# Patient Record
Sex: FEMALE | Race: ASIAN | NOT HISPANIC OR LATINO | URBAN - METROPOLITAN AREA
[De-identification: names, ages, dates, MRNs, and addresses within clinical notes are randomized per-mention and may not be internally consistent; named-entity substitution may affect disease eponyms.]

---

## 2019-10-04 ENCOUNTER — EMERGENCY (EMERGENCY)
Facility: HOSPITAL | Age: 44
LOS: 1 days | Discharge: HOME | End: 2019-10-04
Attending: STUDENT IN AN ORGANIZED HEALTH CARE EDUCATION/TRAINING PROGRAM | Admitting: STUDENT IN AN ORGANIZED HEALTH CARE EDUCATION/TRAINING PROGRAM
Payer: MEDICAID

## 2019-10-04 VITALS
HEART RATE: 73 BPM | DIASTOLIC BLOOD PRESSURE: 84 MMHG | SYSTOLIC BLOOD PRESSURE: 133 MMHG | RESPIRATION RATE: 18 BRPM | TEMPERATURE: 98 F | OXYGEN SATURATION: 99 %

## 2019-10-04 PROCEDURE — 99282 EMERGENCY DEPT VISIT SF MDM: CPT

## 2019-10-04 NOTE — ED ADULT TRIAGE NOTE - CHIEF COMPLAINT QUOTE
Pt states, "I was watching tv and then I started to have a headache and blurry vision in the left eye. I noticed my eye is now red as if I popped a blood vessel." Sent in from [facility] for further evaluation. No neuro deficits noted. Denies PMH.

## 2019-10-05 VITALS
SYSTOLIC BLOOD PRESSURE: 126 MMHG | HEART RATE: 66 BPM | DIASTOLIC BLOOD PRESSURE: 67 MMHG | OXYGEN SATURATION: 100 % | RESPIRATION RATE: 16 BRPM

## 2019-10-05 RX ORDER — ACETAMINOPHEN 500 MG
975 TABLET ORAL ONCE
Refills: 0 | Status: COMPLETED | OUTPATIENT
Start: 2019-10-05 | End: 2019-10-05

## 2019-10-05 RX ADMIN — Medication 975 MILLIGRAM(S): at 01:46

## 2019-10-05 NOTE — ED PROVIDER NOTE - ATTENDING CONTRIBUTION TO CARE
43F h/o iron deficiency anema p/w headache and eye redness. Pt states that she was watching TV when she developed a HA and eye redness. Pt states that the HA is pressure like, full head. Not worst HA ever, nor sudden onset of severe HA. She noted that her L eye was red at the time as well so went to urgent care who sent to ED. Denies head trauma or falls. No n/v. No dizziness/lightheadedness.  Exam:    Gen - nad    HEENT: L eye with subconjunctival hemorrhage to medial aspect, vision 20/20 both eyes, EOMI, PERRL    CV: rrr, no murmurs    Pulm: clear lungs    Neuro: Cn 2-12 grossly intact, no sensory/motor deficits appreciated  MDM: 43F p/w L eye redness and HA starting at approximately 2130 - low suspicion for SAH or other intracranial pathology, will treat symptomatically; eye with subconjunctival hemorrhage and no visual deficits, no further intervention required at this time

## 2019-10-05 NOTE — ED PROVIDER NOTE - NS ED ROS FT
ROS:  GENERAL: No fever, no chills  EYES: no change in vision  HEENT: no trouble swallowing, no trouble speaking  CARDIAC: no chest pain  PULMONARY: no cough, no shortness of breath  GI: no abdominal pain, no nausea, no vomiting, no diarrhea, no constipation  : No dysuria, no frequency, no change in appearance, or odor of urine  SKIN: no rashes  NEURO: + headache, no weakness  MSK: No joint pain  ~Gomez Shah D.O. -Resident

## 2019-10-05 NOTE — ED PROVIDER NOTE - OBJECTIVE STATEMENT
42yo f no pmhx pw left eye redness after watching a movie started to have gradual onset headache and looked in the mirror because her eye felt different and reports like she popped a blood vessel." Sent in from [facility] for further evaluation. Denies recent trauma, fevers, chills, dizziness, nausea, vomiting, dysuria, freq, hematuria, diarrhea, constipation, chest pain, shortness of breath, cough.

## 2019-10-05 NOTE — ED PROVIDER NOTE - CLINICAL SUMMARY MEDICAL DECISION MAKING FREE TEXT BOX
44yo f no pmhx pw left eye redness after watching a movie started to have gradual onset headache and looked in the mirror because her eye felt different and reports like she popped a blood vessel." now with ha and subconjunctival hemorrhage. va intact, ha no red flags, will give po meds, dc pcp/optho fu, offered ct head and iv meds patient refused. given return precautions

## 2019-10-05 NOTE — ED PROVIDER NOTE - NSFOLLOWUPINSTRUCTIONS_ED_ALL_ED_FT
Headache    A headache is pain or discomfort felt around the head or neck area. The specific cause of a headache may not be found as there are many types including tension headaches, migraine headaches, and cluster headaches. Watch your condition for any changes. Things you can do to manage your pain include taking over the counter and prescription medications as instructed by your health care provider, lying down in a dark quiet room, limiting stress, getting regular sleep, and refraining from alcohol and tobacco products.    SEEK IMMEDIATE MEDICAL CARE IF YOU HAVE ANY OF THE FOLLOWING SYMPTOMS: fever, vomiting, stiff neck, loss of vision, problems with speech, muscle weakness, loss of balance, trouble walking, passing out, or confusion.    1. TAKE ALL MEDICATIONS AS DIRECTED.    2. FOR PAIN OR FEVER YOU CAN TAKE IBUPROFEN (MOTRIN, ADVIL) OR ACETAMINOPHEN (TYLENOL) AS NEEDED, AS DIRECTED ON PACKAGING.  3. FOLLOW UP WITH YOUR PRIMARY DOCTOR WITHIN 5 DAYS AS DIRECTED.  4. IF YOU HAD LABS OR IMAGING DONE, YOU WERE GIVEN COPIES OF ALL LABS AND/OR IMAGING RESULTS FROM YOUR ER VISIT--PLEASE TAKE THEM WITH YOU TO YOUR FOLLOW UP APPOINTMENTS.  5. IF NEEDED, CALL PATIENT ACCESS SERVICES AT [phone] TO FIND A PRIMARY CARE PHYSICIAN.  OR CALL [phone] TO MAKE AN APPOINTMENT WITH THE CLINIC.  6. RETURN TO THE ER FOR ANY WORSENING SYMPTOMS OR CONCERNS.

## 2019-10-05 NOTE — ED PROVIDER NOTE - NSFOLLOWUPCLINICS_GEN_ALL_ED_FT
A Family Medicine Doctor  Family Medicine  .  [place]   Phone:   Fax:   Follow Up Time:     [facility]  Ophthalmology  [address], [address]  [place] [address]  Phone: [phone]  Fax:   Follow Up Time: 1-3 Days

## 2019-10-05 NOTE — ED PROVIDER NOTE - PHYSICAL EXAMINATION
Physical Exam:  Gen: NAD, AOx3, non-toxic appearing, able to ambulate without assistance  Head: no scalp abnormalities  Eyes: Acuity 20/20 (R); 20/20 (L), Visual fields are Full; eyes are aligned, lids, conjunctivae and sclera are normal on left, subconjunctival hemorrhage on right; pupils are 3mm and equal; brisk response to light; extraocular movements intact;  Ears: Outer ear without lesions, normal acuity, tympanic canals normal, tympanic membranes with normal light reflex, no erythema or bulge  Nose/Sinuses: Nasal mucosa non-inflamed, Nasal Septum midline, no tenderness over maxillary or frontal sinuses.  Mouth: Normal lips, tongue, gums and teeth, pharynx is non erythematous, tonsils normal sized and without exudates, uvula is midline, moist mucous membranes   Neck: Nontender bilateral tonsilar and anterior cervical nodes, no occipital, auricular, submandibular, submental or supraclavicular nodes, trachea in midline, thyroid lobes not palpable, no crepitus  Lung: CTAB, no respiratory distress, no wheezes/rhonchi/rales B/L, speaking in full sentences  CV: RRR, no murmurs, rubs or gallops  Abd: soft, NT, ND, no guarding, no rigidity, no rebound tenderness, no CVA tenderness   MSK: no visible deformities, ROM normal in UE/LE, no back pain  Neuro: No focal sensory or motor deficits  Skin: Warm, well perfused, no rash, no leg swelling  Psych: normal affect, calm  ~Gomez Shah D.O. -Resident

## 2019-10-05 NOTE — ED PROVIDER NOTE - PATIENT PORTAL LINK FT
You can access the FollowMyHealth Patient Portal offered by [facility] by registering at the following website: [email or web address]. By joining [facility]’s FollowMyHealth portal, you will also be able to view your health information using other applications (apps) compatible with our system.

## 2019-10-05 NOTE — ED PROVIDER NOTE - CARE PLAN
Principal Discharge DX:	Subconjunctival hemorrhage, non-traumatic, right  Secondary Diagnosis:	Headache in back of head

## 2019-10-05 NOTE — ED ADULT NURSE NOTE - OBJECTIVE STATEMENT
Received pt in room 27, ambulatory, pt A&Ox4, respirations even and unlabored b/l. Pt c/o headache with blurry vision/redness in the right eye. Reports was watching TV and believed to have popped a blood vessel in eye, went to [facility] and sent to ED. Abdomen soft, nondistended, nontender. VSS. Awaiting MD boucher. Will continue to monitor.

## 2025-04-14 NOTE — ED ADULT NURSE NOTE - NSIMPLEMENTINTERV_GEN_ALL_ED
Notified by [employer] in Lab Services that WBC 54.2. Secure Chat sent to Teresa Capone NP who is seeing patient today.   Implemented All Universal Safety Interventions:  [place] to call system. Call bell, personal items and telephone within reach. Instruct patient to call for assistance. Room bathroom lighting operational. Non-slip footwear when patient is off stretcher. Physically safe environment: no spills, clutter or unnecessary equipment. Stretcher in lowest position, wheels locked, appropriate side rails in place.